# Patient Record
Sex: FEMALE | Race: AMERICAN INDIAN OR ALASKA NATIVE
[De-identification: names, ages, dates, MRNs, and addresses within clinical notes are randomized per-mention and may not be internally consistent; named-entity substitution may affect disease eponyms.]

---

## 2017-02-20 ENCOUNTER — HOSPITAL ENCOUNTER (OUTPATIENT)
Dept: HOSPITAL 5 - MRI | Age: 37
Discharge: HOME | End: 2017-02-20
Attending: PHYSICAL MEDICINE & REHABILITATION
Payer: MEDICAID

## 2017-02-20 DIAGNOSIS — M25.551: Primary | ICD-10-CM

## 2017-02-20 PROCEDURE — 73721 MRI JNT OF LWR EXTRE W/O DYE: CPT

## 2017-02-21 NOTE — MAGNETIC RESONANCE REPORT
MR LOWER EXTREMITY JOINT RIGHT WITHOUT CONTRAST



History: Right hip pain.



Technique: Multisequence, multiplanar MRI with and without fat 

suppression through the pelvis and right hip.



Comparison: None.



Findings:



The bone marrow signal throughout the visualized pelvis and bilateral 

hips is within normal limits. There is no evidence for fracture, bone 

lesion or osteonecrosis.



No significant joint pathology is detected. No advanced degenerative 

changes.



The musculotendinous structures surrounding the right hip demonstrate 

normal signal. There is no evidence for tendinitis, tenosynovitis or 

rupture. The surrounding musculature is also within normal limits.



No significant joint effusion or bursal fluid is detected. 



MR arthrogram was not performed, but no gross labral abnormality is 

detected.



Impression:

Unremarkable MRI of the right hip.

## 2017-03-19 ENCOUNTER — HOSPITAL ENCOUNTER (EMERGENCY)
Dept: HOSPITAL 5 - ED | Age: 37
Discharge: HOME | End: 2017-03-19
Payer: MEDICAID

## 2017-03-19 VITALS — SYSTOLIC BLOOD PRESSURE: 150 MMHG | DIASTOLIC BLOOD PRESSURE: 88 MMHG

## 2017-03-19 DIAGNOSIS — E11.9: ICD-10-CM

## 2017-03-19 DIAGNOSIS — K04.7: Primary | ICD-10-CM

## 2017-03-19 PROCEDURE — 99282 EMERGENCY DEPT VISIT SF MDM: CPT

## 2017-03-19 NOTE — EMERGENCY DEPARTMENT REPORT
HPI





- General


Chief Complaint: Dental/Oral


Time Seen by Provider: 03/19/17 22:36





- HPI


HPI: 





36-year-old -American female with a past medical history of diabetes 

comes in today for complaint of tooth pain that she's had for 4 days.  Patient 

reports she was seen at [facility] on 03/17/2017 and was prescribed pain 

medicine and antibiotics.  Patient states that the tooth pain remains in not 

getting any better.  She complains of left jaw pain.  She appears to have anal 

clindamycin back in February 27 for the same incident.  She has went to the 

dentist but they would not extracted teeth.





ED Past Medical Hx





- Past Medical History


Previous Medical History?: Yes


Hx Diabetes: Yes





- Surgical History


Past Surgical History?: Yes


Hx Cholecystectomy: Yes


Hx Appendectomy: Yes


Additional Surgical History: csection x 4





- Social History


Smoking Status: Never Smoker


Substance Use Type: Alcohol, Non Opiate Pain, Prescribed





- Medications


Home Medications: 


 Home Medications











 Medication  Instructions  Recorded  Confirmed  Last Taken  Type


 


Clindamycin [Clindamycin CAP] 300 mg PO Q6H #40 capsule 02/27/16  Unknown Rx


 


Liraglutide [Victoza 2-Nithin] 1.5 mg SQ QDAY #1 pen.injctr 02/27/16  Unknown Rx


 


Loratadine [Claritin] 10 mg PO DAILY #30 tablet 02/27/16  Unknown Rx


 


Promethazine /Codeine 5 ml PO Q6H PRN #150 ml 02/27/16  Unknown Rx





[Phenergan/Codeine 6.25-10 mg/5 ml]     


 


glyBURIDE [Glyburide] 10 mg PO BID #60 tablet 02/27/16  Unknown Rx


 


Cyclobenzaprine HCl [Flexeril 5 MG 5 mg PO TID PRN #21 tab 06/16/16  Unknown Rx





TAB]     


 


HYDROcodone/APAP 5-325 [[place] 1 each PO Q6HR PRN #12 tablet 06/16/16  Unknown Rx





5/325]     


 


Ibuprofen [Motrin 800 MG tab] 800 mg PO Q8HR PRN #30 tablet 06/16/16  Unknown Rx


 


Ibuprofen [Motrin 800 MG tab] 800 mg PO Q8HR #30 tablet 03/19/17  Unknown Rx














ED Review of Systems


ROS: 


Stated complaint: TOOTHACHE


Other details as noted in HPI





Constitutional: no symptoms reported





Physical Exam





- Physical Exam


Vital Signs: 


 Vital Signs











  03/19/17





  19:01


 


Temperature 99.2 F


 


Pulse Rate 91 H


 


Respiratory 20





Rate 


 


Blood Pressure 163/96


 


O2 Sat by Pulse 99





Oximetry 











Physical Exam: 


GENERAL: Alert and oriented x3, no apparent distress, Normal Gait, atraumatic. 


HEAD: Head is normocephalic and a-traumatic.  Left jaw swelling and tender to 

palpate


Mouth: Moist mucosa tooth #18 large cavity tender to touch no gingiva 

enlargement


NEUROLOGIC: No focal Deficit, Cranial nerves II through XII are grossly intact. 

No loss of sensation, No facial droop, 


PSYCHIATRIC: Mood is congruent with affect, denies suicidal or homicidal 

ideations. 


SKIN: Warm and dry, No lesions, No ulceration or induration present








ED Course


 Vital Signs











  03/19/17





  19:01


 


Temperature 99.2 F


 


Pulse Rate 91 H


 


Respiratory 20





Rate 


 


Blood Pressure 163/96


 


O2 Sat by Pulse 99





Oximetry 














ED Medical Decision Making





- Medical Decision Making





She has been evaluated by this provider in fast track.  We will offer patient 

ibuprofen 800 mg by mouth now.  Discussed the patient initially said continue 

with the clindamycin and a Tylenol 3 we will add ibuprofen 800 to her regimen 

and is very important for her to follow up with a dentist patient verbalized 

understanding


Critical care attestation.: 


If time is entered above; I have spent that time in minutes in the direct care 

of this critically ill patient, excluding procedure time.








ED Disposition


Clinical Impression: 


 Dental abscess





Disposition: DISCHARGED TO HOME OR SELFCARE


Is pt being admited?: No


Does the pt Need Aspirin: No


Condition: Stable


Instructions:  Dental Abscess (ED)


Additional Instructions: 


Very important for you to follow up with the dentist complete antibiotics as 

prescribed take pain medication as prescribed


Prescriptions: 


Ibuprofen [Motrin 800 MG tab] 800 mg PO Q8HR #30 tablet


Referrals: 


PRIMARY CARE,MD [Primary Care Provider] - 3-5 Days


Forms:  Work/School Release Form(ED)

## 2020-11-12 ENCOUNTER — HOSPITAL ENCOUNTER (EMERGENCY)
Dept: HOSPITAL 5 - ED | Age: 40
End: 2020-11-12
Payer: SELF-PAY

## 2020-11-12 DIAGNOSIS — R53.1: Primary | ICD-10-CM

## 2020-11-12 DIAGNOSIS — Z53.21: ICD-10-CM
